# Patient Record
Sex: MALE | Race: OTHER
[De-identification: names, ages, dates, MRNs, and addresses within clinical notes are randomized per-mention and may not be internally consistent; named-entity substitution may affect disease eponyms.]

---

## 2020-11-06 ENCOUNTER — HOSPITAL ENCOUNTER (EMERGENCY)
Dept: HOSPITAL 62 - ER | Age: 25
LOS: 1 days | Discharge: LEFT BEFORE DISCHARGE | End: 2020-11-07
Payer: SELF-PAY

## 2020-11-06 DIAGNOSIS — Z53.21: Primary | ICD-10-CM

## 2020-11-07 ENCOUNTER — HOSPITAL ENCOUNTER (EMERGENCY)
Dept: HOSPITAL 62 - ER | Age: 25
Discharge: HOME | End: 2020-11-07
Payer: SELF-PAY

## 2020-11-07 VITALS — SYSTOLIC BLOOD PRESSURE: 124 MMHG | DIASTOLIC BLOOD PRESSURE: 73 MMHG

## 2020-11-07 DIAGNOSIS — S81.832A: Primary | ICD-10-CM
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
## 2020-11-07 DIAGNOSIS — S81.842A: ICD-10-CM

## 2020-11-07 PROCEDURE — 99284 EMERGENCY DEPT VISIT MOD MDM: CPT

## 2020-11-07 PROCEDURE — 93971 EXTREMITY STUDY: CPT
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
## 2020-11-24 ENCOUNTER — HOSPITAL ENCOUNTER (EMERGENCY)
Dept: HOSPITAL 62 - ER | Age: 25
Discharge: HOME | End: 2020-11-24
Payer: SELF-PAY

## 2020-11-24 VITALS — DIASTOLIC BLOOD PRESSURE: 79 MMHG | SYSTOLIC BLOOD PRESSURE: 121 MMHG

## 2020-11-24 DIAGNOSIS — M79.5: ICD-10-CM

## 2020-11-24 DIAGNOSIS — F17.200: ICD-10-CM

## 2020-11-24 DIAGNOSIS — M79.662: Primary | ICD-10-CM

## 2020-11-24 DIAGNOSIS — M25.562: ICD-10-CM

## 2020-11-24 PROCEDURE — 99284 EMERGENCY DEPT VISIT MOD MDM: CPT

## 2020-11-24 PROCEDURE — 93971 EXTREMITY STUDY: CPT
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
